# Patient Record
Sex: MALE | Race: WHITE | Employment: UNEMPLOYED
[De-identification: names, ages, dates, MRNs, and addresses within clinical notes are randomized per-mention and may not be internally consistent; named-entity substitution may affect disease eponyms.]

---

## 2017-02-06 ENCOUNTER — TELEPHONE (OUTPATIENT)
Dept: FAMILY MEDICINE CLINIC | Facility: CLINIC | Age: 47
End: 2017-02-06

## 2017-02-06 ENCOUNTER — OFFICE VISIT (OUTPATIENT)
Dept: FAMILY MEDICINE CLINIC | Facility: CLINIC | Age: 47
End: 2017-02-06

## 2017-02-06 VITALS
BODY MASS INDEX: 26.05 KG/M2 | OXYGEN SATURATION: 98 % | HEART RATE: 74 BPM | DIASTOLIC BLOOD PRESSURE: 70 MMHG | HEIGHT: 70 IN | SYSTOLIC BLOOD PRESSURE: 100 MMHG | TEMPERATURE: 97.6 F | WEIGHT: 182 LBS

## 2017-02-06 DIAGNOSIS — Z95.4 HISTORY OF AORTIC VALVE REPLACEMENT WITH METALLIC VALVE: ICD-10-CM

## 2017-02-06 DIAGNOSIS — I63.412 CEREBROVASCULAR ACCIDENT (CVA) DUE TO EMBOLISM OF LEFT MIDDLE CEREBRAL ARTERY (HCC): Primary | ICD-10-CM

## 2017-02-06 DIAGNOSIS — E78.5 DYSLIPIDEMIA: ICD-10-CM

## 2017-02-06 DIAGNOSIS — J32.9 SINUSITIS, UNSPECIFIED CHRONICITY, UNSPECIFIED LOCATION: ICD-10-CM

## 2017-02-06 PROCEDURE — 99213 OFFICE O/P EST LOW 20 MIN: CPT | Performed by: PHYSICIAN ASSISTANT

## 2017-02-06 RX ORDER — AMOXICILLIN 500 MG/1
1000 CAPSULE ORAL 2 TIMES DAILY
Qty: 4 CAPSULE | Refills: 0 | Status: SHIPPED | OUTPATIENT
Start: 2017-02-06 | End: 2017-02-16

## 2017-02-06 RX ORDER — AMOXICILLIN AND CLAVULANATE POTASSIUM 875; 125 MG/1; MG/1
1 TABLET, FILM COATED ORAL 2 TIMES DAILY
Qty: 20 TABLET | Refills: 0 | Status: SHIPPED | OUTPATIENT
Start: 2017-02-06 | End: 2017-02-16

## 2017-02-06 ASSESSMENT — ENCOUNTER SYMPTOMS
NAUSEA: 0
EYE DISCHARGE: 0
SORE THROAT: 0
HOARSE VOICE: 0
CHEST TIGHTNESS: 0
ABDOMINAL PAIN: 0
COUGH: 0
SHORTNESS OF BREATH: 0
DIARRHEA: 0
VOMITING: 0
EYE ITCHING: 0
RHINORRHEA: 0
SINUS PRESSURE: 1

## 2017-02-06 ASSESSMENT — PATIENT HEALTH QUESTIONNAIRE - PHQ9
2. FEELING DOWN, DEPRESSED OR HOPELESS: 0
SUM OF ALL RESPONSES TO PHQ9 QUESTIONS 1 & 2: 0
SUM OF ALL RESPONSES TO PHQ QUESTIONS 1-9: 0
1. LITTLE INTEREST OR PLEASURE IN DOING THINGS: 0

## 2017-02-08 ENCOUNTER — TELEPHONE (OUTPATIENT)
Dept: PHARMACY | Age: 47
End: 2017-02-08

## 2017-03-08 ENCOUNTER — APPOINTMENT (OUTPATIENT)
Dept: GENERAL RADIOLOGY | Age: 47
End: 2017-03-08
Payer: COMMERCIAL

## 2017-03-08 ENCOUNTER — HOSPITAL ENCOUNTER (EMERGENCY)
Age: 47
Discharge: HOME | End: 2017-03-08
Attending: EMERGENCY MEDICINE
Payer: COMMERCIAL

## 2017-03-08 ENCOUNTER — HOSPITAL ENCOUNTER (OUTPATIENT)
Dept: PHARMACY | Age: 47
Setting detail: THERAPIES SERIES
Discharge: HOME | End: 2017-03-08
Payer: COMMERCIAL

## 2017-03-08 VITALS
HEART RATE: 74 BPM | DIASTOLIC BLOOD PRESSURE: 75 MMHG | HEIGHT: 70 IN | TEMPERATURE: 97.8 F | BODY MASS INDEX: 25.77 KG/M2 | SYSTOLIC BLOOD PRESSURE: 136 MMHG | WEIGHT: 180 LBS | OXYGEN SATURATION: 97 % | RESPIRATION RATE: 16 BRPM

## 2017-03-08 DIAGNOSIS — M77.9 TENDONITIS: Primary | ICD-10-CM

## 2017-03-08 DIAGNOSIS — Z95.4 HISTORY OF AORTIC VALVE REPLACEMENT WITH METALLIC VALVE: ICD-10-CM

## 2017-03-08 LAB
INR BLD: 1.7
PROTIME: 20.1 SECONDS

## 2017-03-08 PROCEDURE — 73110 X-RAY EXAM OF WRIST: CPT

## 2017-03-08 PROCEDURE — 29125 APPL SHORT ARM SPLINT STATIC: CPT

## 2017-03-08 PROCEDURE — G0463 HOSPITAL OUTPT CLINIC VISIT: HCPCS

## 2017-03-08 PROCEDURE — 85610 PROTHROMBIN TIME: CPT

## 2017-03-08 PROCEDURE — G0381 LEV 2 HOSP TYPE B ED VISIT: HCPCS

## 2017-03-08 RX ORDER — TRAMADOL HYDROCHLORIDE 50 MG/1
50 TABLET ORAL EVERY 6 HOURS PRN
Qty: 10 TABLET | Refills: 0 | Status: SHIPPED | OUTPATIENT
Start: 2017-03-08

## 2017-03-08 ASSESSMENT — ENCOUNTER SYMPTOMS
COLOR CHANGE: 0
SHORTNESS OF BREATH: 0
VOMITING: 0
WHEEZING: 0
BACK PAIN: 0
ABDOMINAL DISTENTION: 0
ABDOMINAL PAIN: 0
NAUSEA: 0
DIARRHEA: 0

## 2017-03-08 ASSESSMENT — PAIN DESCRIPTION - PAIN TYPE: TYPE: ACUTE PAIN

## 2017-03-08 ASSESSMENT — PAIN DESCRIPTION - LOCATION: LOCATION: WRIST

## 2017-03-08 ASSESSMENT — PAIN DESCRIPTION - ORIENTATION: ORIENTATION: LEFT

## 2017-03-08 ASSESSMENT — PAIN DESCRIPTION - FREQUENCY: FREQUENCY: INTERMITTENT

## 2017-03-08 ASSESSMENT — PAIN SCALES - GENERAL: PAINLEVEL_OUTOF10: 7

## 2017-03-22 ENCOUNTER — TELEPHONE (OUTPATIENT)
Dept: PHARMACY | Age: 47
End: 2017-03-22

## 2017-04-10 ENCOUNTER — HOSPITAL ENCOUNTER (OUTPATIENT)
Dept: PHARMACY | Age: 47
Setting detail: THERAPIES SERIES
Discharge: HOME | End: 2017-04-10
Payer: COMMERCIAL

## 2017-04-10 DIAGNOSIS — Z95.4 HISTORY OF AORTIC VALVE REPLACEMENT WITH METALLIC VALVE: ICD-10-CM

## 2017-04-10 LAB
INR BLD: 3.4
PROTIME: 40.7 SECONDS

## 2017-04-10 PROCEDURE — 85610 PROTHROMBIN TIME: CPT

## 2017-04-10 PROCEDURE — G0463 HOSPITAL OUTPT CLINIC VISIT: HCPCS

## 2017-05-18 ENCOUNTER — TELEPHONE (OUTPATIENT)
Dept: PHARMACY | Age: 47
End: 2017-05-18

## 2017-07-11 ENCOUNTER — TELEPHONE (OUTPATIENT)
Dept: PHARMACY | Age: 47
End: 2017-07-11

## 2017-07-25 ENCOUNTER — TELEPHONE (OUTPATIENT)
Dept: PHARMACY | Age: 47
End: 2017-07-25

## 2017-08-24 ENCOUNTER — TELEPHONE (OUTPATIENT)
Dept: FAMILY MEDICINE CLINIC | Age: 47
End: 2017-08-24

## 2017-08-24 ENCOUNTER — TELEPHONE (OUTPATIENT)
Dept: PHARMACY | Age: 47
End: 2017-08-24

## 2025-06-25 ENCOUNTER — HOSPITAL ENCOUNTER (INPATIENT)
Age: 55
LOS: 2 days | Discharge: HOME | End: 2025-06-27
Attending: STUDENT IN AN ORGANIZED HEALTH CARE EDUCATION/TRAINING PROGRAM | Admitting: INTERNAL MEDICINE
Payer: COMMERCIAL

## 2025-06-25 ENCOUNTER — APPOINTMENT (OUTPATIENT)
Dept: GENERAL RADIOLOGY | Age: 55
End: 2025-06-25
Payer: COMMERCIAL

## 2025-06-25 ENCOUNTER — APPOINTMENT (OUTPATIENT)
Dept: CT IMAGING | Age: 55
End: 2025-06-25
Payer: COMMERCIAL

## 2025-06-25 DIAGNOSIS — I24.9 ACUTE CORONARY SYNDROME (HCC): ICD-10-CM

## 2025-06-25 DIAGNOSIS — F14.10 COCAINE ABUSE (HCC): Primary | ICD-10-CM

## 2025-06-25 DIAGNOSIS — R07.9 CHEST PAIN, UNSPECIFIED TYPE: ICD-10-CM

## 2025-06-25 PROBLEM — I21.4 NSTEMI (NON-ST ELEVATED MYOCARDIAL INFARCTION) (HCC): Status: ACTIVE | Noted: 2025-06-25

## 2025-06-25 LAB
ALBUMIN SERPL-MCNC: 4.2 G/DL (ref 3.5–5.2)
ALBUMIN/GLOB SERPL: 1.7 {RATIO} (ref 1–2.5)
ALP SERPL-CCNC: 94 U/L (ref 40–129)
ALT SERPL-CCNC: 16 U/L (ref 10–50)
AMPHET UR QL SCN: POSITIVE
ANION GAP SERPL CALCULATED.3IONS-SCNC: 16 MMOL/L (ref 9–16)
AST SERPL-CCNC: 28 U/L (ref 10–50)
BARBITURATES UR QL SCN: NEGATIVE
BASOPHILS # BLD: 0.07 K/UL (ref 0–0.2)
BASOPHILS NFR BLD: 1 % (ref 0–2)
BENZODIAZ UR QL: NEGATIVE
BILIRUB SERPL-MCNC: 0.5 MG/DL (ref 0–1.2)
BUN SERPL-MCNC: 11 MG/DL (ref 6–20)
CALCIUM SERPL-MCNC: 9.3 MG/DL (ref 8.6–10.4)
CANNABINOIDS UR QL SCN: POSITIVE
CHLORIDE SERPL-SCNC: 98 MMOL/L (ref 98–107)
CHOLEST SERPL-MCNC: 152 MG/DL (ref 0–199)
CHOLESTEROL/HDL RATIO: 3.2
CO2 SERPL-SCNC: 19 MMOL/L (ref 20–31)
COCAINE UR QL SCN: POSITIVE
CREAT SERPL-MCNC: 1.2 MG/DL (ref 0.7–1.2)
EOSINOPHIL # BLD: 0.13 K/UL (ref 0–0.44)
EOSINOPHILS RELATIVE PERCENT: 1 % (ref 1–4)
ERYTHROCYTE [DISTWIDTH] IN BLOOD BY AUTOMATED COUNT: 12.6 % (ref 11.8–14.4)
EST. AVERAGE GLUCOSE BLD GHB EST-MCNC: 97 MG/DL
ETHANOL PERCENT: <0.01 %
ETHANOLAMINE SERPL-MCNC: <10 MG/DL (ref 0–0.08)
FENTANYL UR QL: NEGATIVE
GFR, ESTIMATED: 73 ML/MIN/1.73M2
GLUCOSE SERPL-MCNC: 101 MG/DL (ref 74–99)
HBA1C MFR BLD: 5 % (ref 4–6)
HCT VFR BLD AUTO: 38.5 % (ref 40.7–50.3)
HDLC SERPL-MCNC: 47 MG/DL
HGB BLD-MCNC: 13.8 G/DL (ref 13–17)
IMM GRANULOCYTES # BLD AUTO: 0.04 K/UL (ref 0–0.3)
IMM GRANULOCYTES NFR BLD: 0 %
INR PPP: 5.3
LDLC SERPL CALC-MCNC: 92 MG/DL (ref 0–100)
LYMPHOCYTES NFR BLD: 1.25 K/UL (ref 1.1–3.7)
LYMPHOCYTES RELATIVE PERCENT: 13 % (ref 24–43)
MCH RBC QN AUTO: 32.1 PG (ref 25.2–33.5)
MCHC RBC AUTO-ENTMCNC: 35.8 G/DL (ref 28.4–34.8)
MCV RBC AUTO: 89.5 FL (ref 82.6–102.9)
METHADONE UR QL: NEGATIVE
MONOCYTES NFR BLD: 1.08 K/UL (ref 0.1–1.2)
MONOCYTES NFR BLD: 11 % (ref 3–12)
NEUTROPHILS NFR BLD: 74 % (ref 36–65)
NEUTS SEG NFR BLD: 7.11 K/UL (ref 1.5–8.1)
NRBC BLD-RTO: 0 PER 100 WBC
OPIATES UR QL SCN: NEGATIVE
OXYCODONE UR QL SCN: NEGATIVE
PCP UR QL SCN: NEGATIVE
PLATELET # BLD AUTO: 290 K/UL (ref 138–453)
PMV BLD AUTO: 9.9 FL (ref 8.1–13.5)
POTASSIUM SERPL-SCNC: 3.8 MMOL/L (ref 3.7–5.3)
PROT SERPL-MCNC: 6.7 G/DL (ref 6.6–8.7)
PROTHROMBIN TIME: 50.9 SEC (ref 11.7–14.9)
RBC # BLD AUTO: 4.3 M/UL (ref 4.21–5.77)
SODIUM SERPL-SCNC: 133 MMOL/L (ref 136–145)
TEST INFORMATION: ABNORMAL
TRIGL SERPL-MCNC: 67 MG/DL
TROPONIN I SERPL HS-MCNC: 10 NG/L (ref 0–22)
TROPONIN I SERPL HS-MCNC: 23 NG/L (ref 0–22)
TROPONIN I SERPL HS-MCNC: 48 NG/L (ref 0–22)
VLDLC SERPL CALC-MCNC: 13 MG/DL (ref 1–30)
WBC OTHER # BLD: 9.7 K/UL (ref 3.5–11.3)

## 2025-06-25 PROCEDURE — 84484 ASSAY OF TROPONIN QUANT: CPT

## 2025-06-25 PROCEDURE — 2500000003 HC RX 250 WO HCPCS

## 2025-06-25 PROCEDURE — 70450 CT HEAD/BRAIN W/O DYE: CPT

## 2025-06-25 PROCEDURE — 85025 COMPLETE CBC W/AUTO DIFF WBC: CPT

## 2025-06-25 PROCEDURE — 83036 HEMOGLOBIN GLYCOSYLATED A1C: CPT

## 2025-06-25 PROCEDURE — G0480 DRUG TEST DEF 1-7 CLASSES: HCPCS

## 2025-06-25 PROCEDURE — 1200000000 HC SEMI PRIVATE

## 2025-06-25 PROCEDURE — 99223 1ST HOSP IP/OBS HIGH 75: CPT | Performed by: INTERNAL MEDICINE

## 2025-06-25 PROCEDURE — 71045 X-RAY EXAM CHEST 1 VIEW: CPT

## 2025-06-25 PROCEDURE — 99285 EMERGENCY DEPT VISIT HI MDM: CPT

## 2025-06-25 PROCEDURE — 6360000002 HC RX W HCPCS: Performed by: STUDENT IN AN ORGANIZED HEALTH CARE EDUCATION/TRAINING PROGRAM

## 2025-06-25 PROCEDURE — 80061 LIPID PANEL: CPT

## 2025-06-25 PROCEDURE — 85610 PROTHROMBIN TIME: CPT

## 2025-06-25 PROCEDURE — 96375 TX/PRO/DX INJ NEW DRUG ADDON: CPT

## 2025-06-25 PROCEDURE — 96374 THER/PROPH/DIAG INJ IV PUSH: CPT

## 2025-06-25 PROCEDURE — 93005 ELECTROCARDIOGRAM TRACING: CPT

## 2025-06-25 PROCEDURE — 80307 DRUG TEST PRSMV CHEM ANLYZR: CPT

## 2025-06-25 PROCEDURE — 93005 ELECTROCARDIOGRAM TRACING: CPT | Performed by: STUDENT IN AN ORGANIZED HEALTH CARE EDUCATION/TRAINING PROGRAM

## 2025-06-25 PROCEDURE — 80053 COMPREHEN METABOLIC PANEL: CPT

## 2025-06-25 RX ORDER — HEPARIN SODIUM 1000 [USP'U]/ML
4000 INJECTION, SOLUTION INTRAVENOUS; SUBCUTANEOUS PRN
Status: DISCONTINUED | OUTPATIENT
Start: 2025-06-25 | End: 2025-06-25

## 2025-06-25 RX ORDER — ASPIRIN 81 MG/1
81 TABLET, CHEWABLE ORAL DAILY
Status: DISCONTINUED | OUTPATIENT
Start: 2025-06-26 | End: 2025-06-27 | Stop reason: HOSPADM

## 2025-06-25 RX ORDER — SODIUM CHLORIDE 0.9 % (FLUSH) 0.9 %
5-40 SYRINGE (ML) INJECTION PRN
Status: DISCONTINUED | OUTPATIENT
Start: 2025-06-25 | End: 2025-06-27 | Stop reason: HOSPADM

## 2025-06-25 RX ORDER — ONDANSETRON 4 MG/1
4 TABLET, ORALLY DISINTEGRATING ORAL EVERY 8 HOURS PRN
Status: DISCONTINUED | OUTPATIENT
Start: 2025-06-25 | End: 2025-06-27 | Stop reason: HOSPADM

## 2025-06-25 RX ORDER — FENTANYL CITRATE 50 UG/ML
50 INJECTION, SOLUTION INTRAMUSCULAR; INTRAVENOUS ONCE
Status: COMPLETED | OUTPATIENT
Start: 2025-06-25 | End: 2025-06-25

## 2025-06-25 RX ORDER — POTASSIUM CHLORIDE 7.45 MG/ML
10 INJECTION INTRAVENOUS PRN
Status: DISCONTINUED | OUTPATIENT
Start: 2025-06-25 | End: 2025-06-27 | Stop reason: HOSPADM

## 2025-06-25 RX ORDER — POTASSIUM CHLORIDE 1500 MG/1
40 TABLET, EXTENDED RELEASE ORAL PRN
Status: DISCONTINUED | OUTPATIENT
Start: 2025-06-25 | End: 2025-06-27 | Stop reason: HOSPADM

## 2025-06-25 RX ORDER — LORAZEPAM 0.5 MG/1
1 TABLET ORAL ONCE
Status: DISCONTINUED | OUTPATIENT
Start: 2025-06-25 | End: 2025-06-25

## 2025-06-25 RX ORDER — NITROGLYCERIN 0.4 MG/1
0.4 TABLET SUBLINGUAL PRN
Status: DISCONTINUED | OUTPATIENT
Start: 2025-06-25 | End: 2025-06-27 | Stop reason: HOSPADM

## 2025-06-25 RX ORDER — ATORVASTATIN CALCIUM 80 MG/1
40 TABLET, FILM COATED ORAL NIGHTLY
Status: DISCONTINUED | OUTPATIENT
Start: 2025-06-25 | End: 2025-06-27 | Stop reason: HOSPADM

## 2025-06-25 RX ORDER — HEPARIN SODIUM 10000 [USP'U]/100ML
5-30 INJECTION, SOLUTION INTRAVENOUS CONTINUOUS
Status: DISCONTINUED | OUTPATIENT
Start: 2025-06-25 | End: 2025-06-25

## 2025-06-25 RX ORDER — ACETAMINOPHEN 650 MG/1
650 SUPPOSITORY RECTAL EVERY 6 HOURS PRN
Status: DISCONTINUED | OUTPATIENT
Start: 2025-06-25 | End: 2025-06-27 | Stop reason: HOSPADM

## 2025-06-25 RX ORDER — LORAZEPAM 2 MG/ML
1 INJECTION INTRAMUSCULAR ONCE
Status: COMPLETED | OUTPATIENT
Start: 2025-06-25 | End: 2025-06-25

## 2025-06-25 RX ORDER — ONDANSETRON 2 MG/ML
4 INJECTION INTRAMUSCULAR; INTRAVENOUS EVERY 6 HOURS PRN
Status: DISCONTINUED | OUTPATIENT
Start: 2025-06-25 | End: 2025-06-27 | Stop reason: HOSPADM

## 2025-06-25 RX ORDER — SODIUM CHLORIDE 0.9 % (FLUSH) 0.9 %
5-40 SYRINGE (ML) INJECTION EVERY 12 HOURS SCHEDULED
Status: DISCONTINUED | OUTPATIENT
Start: 2025-06-25 | End: 2025-06-27 | Stop reason: HOSPADM

## 2025-06-25 RX ORDER — MAGNESIUM SULFATE IN WATER 40 MG/ML
2000 INJECTION, SOLUTION INTRAVENOUS PRN
Status: DISCONTINUED | OUTPATIENT
Start: 2025-06-25 | End: 2025-06-27 | Stop reason: HOSPADM

## 2025-06-25 RX ORDER — ACETAMINOPHEN 325 MG/1
650 TABLET ORAL EVERY 6 HOURS PRN
Status: DISCONTINUED | OUTPATIENT
Start: 2025-06-25 | End: 2025-06-27 | Stop reason: HOSPADM

## 2025-06-25 RX ORDER — POLYETHYLENE GLYCOL 3350 17 G/17G
17 POWDER, FOR SOLUTION ORAL DAILY PRN
Status: DISCONTINUED | OUTPATIENT
Start: 2025-06-25 | End: 2025-06-27 | Stop reason: HOSPADM

## 2025-06-25 RX ORDER — HEPARIN SODIUM 1000 [USP'U]/ML
4000 INJECTION, SOLUTION INTRAVENOUS; SUBCUTANEOUS ONCE
Status: DISCONTINUED | OUTPATIENT
Start: 2025-06-25 | End: 2025-06-25

## 2025-06-25 RX ORDER — SODIUM CHLORIDE 9 MG/ML
INJECTION, SOLUTION INTRAVENOUS PRN
Status: DISCONTINUED | OUTPATIENT
Start: 2025-06-25 | End: 2025-06-27 | Stop reason: HOSPADM

## 2025-06-25 RX ORDER — HEPARIN SODIUM 1000 [USP'U]/ML
2000 INJECTION, SOLUTION INTRAVENOUS; SUBCUTANEOUS PRN
Status: DISCONTINUED | OUTPATIENT
Start: 2025-06-25 | End: 2025-06-25

## 2025-06-25 RX ADMIN — Medication 1 MG: at 12:12

## 2025-06-25 RX ADMIN — SODIUM CHLORIDE, PRESERVATIVE FREE 10 ML: 5 INJECTION INTRAVENOUS at 21:45

## 2025-06-25 RX ADMIN — FENTANYL CITRATE 50 MCG: 50 INJECTION INTRAMUSCULAR; INTRAVENOUS at 11:35

## 2025-06-25 ASSESSMENT — PAIN DESCRIPTION - FREQUENCY: FREQUENCY: CONTINUOUS

## 2025-06-25 ASSESSMENT — PAIN SCALES - GENERAL: PAINLEVEL_OUTOF10: 10

## 2025-06-25 ASSESSMENT — ENCOUNTER SYMPTOMS
SHORTNESS OF BREATH: 0
COUGH: 0

## 2025-06-25 ASSESSMENT — PAIN - FUNCTIONAL ASSESSMENT: PAIN_FUNCTIONAL_ASSESSMENT: 0-10

## 2025-06-25 ASSESSMENT — PAIN DESCRIPTION - LOCATION: LOCATION: CHEST

## 2025-06-25 ASSESSMENT — PAIN DESCRIPTION - PAIN TYPE: TYPE: ACUTE PAIN
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
## 2025-06-25 NOTE — H&P
[facility]     Department of Internal Medicine - Staff Internal Medicine Teaching Service          ADMISSION NOTE/HISTORY AND PHYSICAL EXAMINATION   Date: 6/25/2025  Patient Name: Jj Murry  Date of admission: 6/25/2025 11:09 AM  YOB: 1970  PCP: Teresa Alejandro PA-C  History Obtained From:  patient, electronic medical record    CHIEF COMPLAINT     Chest pain    HISTORY OF PRESENTING ILLNESS     The patient is a 54 y.o. 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  According the patient he was all right until today when he started having chest pain sudden in onset while running for exercise however the chest pain was central, nonradiating and associated with shortness of breath and palpitation, he never lost consciousness no episodes of dizziness or lightheadedness.  He currently takes Coumadin however does not compliantly follow-up with Coumadin clinic, his last dose was yesterday.    Review of Systems   Constitutional:  Negative for chills and fever.   Respiratory:  Negative for cough and shortness of breath.    Psychiatric/Behavioral:  Positive for decreased concentration.         Initial Vitals on Presentation:  Temp: Afebrile, RR: 26,  sinus rhythm, /87, SPO2 98% on room air    Initial Course in the ED:   - Patient was escorted by EMS to ED when patient was found outside being altered in creatinine chest, patient was agitated and unable to sit still, patient was also hallucinating, patient was hemodynamically stable however was in sinus tachycardia as well  - Initial lab workup showed sodium 133, glucose 101, troponin, 34 metabolites and cannabinoids and INR was therapeutic about 5.3  - Imaging studies with chest x-ray showed no acute cardiopulmonary process however CT head was done as well which showed chronic

## 2025-06-25 NOTE — ED PROVIDER NOTES
[facility]  Emergency Department  Emergency Medicine Resident Turn-Over     Note Started: 2:42 PM EDT    Care of jJ Murry was assumed from Dr. Zuniga and is being seen for Chest Pain and Altered Mental Status  .  The patient's initial evaluation and plan have been discussed with the prior provider who initially evaluated the patient.     EMERGENCY DEPARTMENT COURSE / MEDICAL DECISION MAKING:       MEDICATIONS GIVEN:  Orders Placed This Encounter   Medications    fentaNYL (SUBLIMAZE) injection 50 mcg    DISCONTD: LORazepam (ATIVAN) tablet 1 mg    LORazepam (ATIVAN) injection 1 mg    DISCONTD: heparin (porcine) injection 4,000 Units    DISCONTD: heparin (porcine) injection 4,000 Units    DISCONTD: heparin (porcine) injection 2,000 Units    DISCONTD: heparin 25,000 units in dextrose 5% 250 mL (premix) infusion    sodium chloride flush 0.9 % injection 5-40 mL    sodium chloride flush 0.9 % injection 5-40 mL    0.9 % sodium chloride infusion    OR Linked Order Group     potassium chloride (KLOR-CON M) extended release tablet 40 mEq     potassium bicarb-citric acid (EFFER-K) effervescent tablet 40 mEq     potassium chloride 10 mEq/100 mL IVPB (Peripheral Line)    magnesium sulfate 2000 mg in 50 mL IVPB premix    OR Linked Order Group     ondansetron (ZOFRAN-ODT) disintegrating tablet 4 mg     ondansetron (ZOFRAN) injection 4 mg    polyethylene glycol (GLYCOLAX) packet 17 g    OR Linked Order Group     acetaminophen (TYLENOL) tablet 650 mg     acetaminophen (TYLENOL) suppository 650 mg    melatonin tablet 3 mg    nitroGLYCERIN (NITROSTAT) SL tablet 0.4 mg    atorvastatin (LIPITOR) tablet 40 mg    aspirin chewable tablet 81 mg    warfarin placeholder: dosing by pharmacy     What is the patient's goal INR?:   Other     Other INR goal:   2.0-2.5       LABS / RADIOLOGY:     Labs Reviewed   CBC WITH AUTO DIFFERENTIAL - Abnormal; Notable for the following components:       Result Value    Hematocrit 38.5 (*)      MCHC 35.8 (*)     Neutrophils % 74 (*)     Lymphocytes % 13 (*)     All other components within normal limits   COMPREHENSIVE METABOLIC PANEL - Abnormal; Notable for the following components:    Sodium 133 (*)     CO2 19 (*)     Glucose 101 (*)     All other components within normal limits   TROPONIN - Abnormal; Notable for the following components:    Troponin, High Sensitivity 23 (*)     All other components within normal limits   URINE DRUG SCREEN - Abnormal; Notable for the following components:    Amphetamine Screen, Ur POSITIVE (*)     Cocaine Metabolite, Urine POSITIVE (*)     Cannabinoid Scrn, Ur POSITIVE (*)     All other components within normal limits   PROTIME-INR - Abnormal; Notable for the following components:    Protime 50.9 (*)     INR 5.3 (*)     All other components within normal limits   TROPONIN - Abnormal; Notable for the following components:    Troponin, High Sensitivity 48 (*)     All other components within normal limits   TROPONIN - Abnormal; Notable for the following components:    Troponin, High Sensitivity 24 (*)     All other components within normal limits   CULTURE, BLOOD 1   CULTURE, BLOOD 2   TROPONIN   ETHANOL   LIPID PANEL   HEMOGLOBIN A1C   BASIC METABOLIC PANEL W/ REFLEX TO MG FOR LOW K   CBC WITH AUTO DIFFERENTIAL   TROPONIN   TROPONIN   PROTIME-INR       CT HEAD WO CONTRAST  Result Date: 6/25/2025  EXAMINATION: CT OF THE HEAD WITHOUT CONTRAST  6/25/2025 1:28 pm TECHNIQUE: CT of the head was performed without the administration of intravenous contrast. Automated exposure control, iterative reconstruction, and/or weight based adjustment of the mA/kV was utilized to reduce the radiation dose to as low as reasonably achievable. COMPARISON: None. HISTORY: ORDERING SYSTEM PROVIDED HISTORY: AMS TECHNOLOGIST PROVIDED HISTORY: AMS Decision Support Exception - unselect if not a suspected or confirmed emergency medical condition->Emergency Medical Condition (MA) Reason for Exam:

## 2025-06-25 NOTE — DISCHARGE INSTRUCTIONS
You were admitted and treated for chest pain.  Please abstain from cocaine  You were evaluated by cardiology and your coronary CT showed 50% blockage of left circumflex vessel therefore please follow-up with cardiology outpatient.  Please get transthoracic echo done and follow-up with results at outpatient cardiology clinic.  Your INR was elevated so we could not put you on anticoagulation in the hospital.  Current INR is within the range so please continue taking Coumadin as prescribed and follow-up with Coumadin clinic.  You are started on nitro state, please take it as needed in case of chest pain.  Please quit smoking and please refrain from  IV drugs such as cocaine, amphetamine and marijuana as it can lead to worsening chest pain and heart attack.  Eat a heart-healthy diet that is low in saturated fat and salt, and is full of fruits, vegetables and whole-grains. You may get more details about how to eat healthy. But these tips can help you get started.

## 2025-06-25 NOTE — H&P
Attending Supervising Physician’s Attestation Statement  I have seen and examined Jj Murry and the peace elements of all parts of the encounter have been performed by me.  I agree with the assessment, plan and orders as documented. I discussed the findings and plans with the resident physician and agree as documented in their note .    In addition to the pertinent positives and negatives as stated within HPI and the review of systems as documented in the notes, all other systems were reviewed when able to and are reported negative.    Additional Comments:   Presented with chest pain  Hx CVA, baseline aphasia  Hx from family at bedside  C/o chest pain for several weeks  Hx cad/cabg, no recent follow up with cardiology  Troponin elevated  Cardiology consulted   Continue to trend troponin     Electronically signed by Gabino Sanchez MD on 6/25/2025 at 5:45 PM

## 2025-06-25 NOTE — ED PROVIDER NOTES
[place] [facility] EMERGENCY DEPARTMENT  EMERGENCY DEPARTMENT ENCOUNTER    Pt Name: Jj Murry  MRN: [id number]  [id number]  Date of evaluation: 2025    Note Started: 11:29 AM EDT    CHIEF COMPLAINT       Chief Complaint   Patient presents with    Chest Pain    Altered Mental Status     HISTORY OF PRESENT ILLNESS    Jj Murry is a 54 y.o. male who presents with chest pain which started while apparently out for a run. EMS was called and patient was extremely agitated. Patient is difficult historian grabbing at chest. He states he has difficulty with speech since prior stroke. Denies headache. Denies trauma.     Primary has care performed at [facility].   Hx of ischemic stroke with resultant expressive aphasia, prosthetic heart valve on coumadine,     REVIEW OF SYSTEMS       Review of Systems   Cardiovascular:  Positive for chest pain.   Psychiatric/Behavioral:  Positive for agitation and confusion.        PAST MEDICAL HISTORY    has a past medical history of Stroke of unknown cause (HCC).    SURGICAL HISTORY      has a past surgical history that includes Cardiac surgery (2008) and Mechanical aortic valve replacement.    CURRENT MEDICATIONS       Previous Medications    ASPIRIN 81 MG CHEWABLE TABLET    Take 1 tablet by mouth daily    ATORVASTATIN (LIPITOR) 40 MG TABLET    Take 1 tablet by mouth nightly    TRAMADOL (ULTRAM) 50 MG TABLET    Take 1 tablet by mouth every 6 hours as needed for Pain    WARFARIN (COUMADIN) 7.5 MG TABLET    Take one or one and one-half (1 or 1 1/2) tablets by mouth once daily as directed by Coumadin Clinic. #150 = 90 day supply)       ALLERGIES     has no known allergies.    FAMILY HISTORY     He indicated that his mother is alive. He indicated that his father is alive. He indicated that his maternal grandmother is alive. He indicated that his maternal grandfather is . He indicated that his paternal grandmother is . He indicated that his   [CP]   1548 CT HEAD WO CONTRAST  IMPRESSION:  1.  No acute intracranial findings.  2.  Chronic appearing encephalomalacia of the left temporoparietal lobes is compatible with remote territorial infarct. [EF]      ED Course User Index  [CP] Bj Carlson MD  [EF] Rajiv Restrepo DO         CRITICAL CARE:     There was significant risk of life threatening deterioration of patient's condition requiring my direct management. Critical care time 25 minutes, excluding any documented procedures.      CONSULTS:  IP CONSULT TO CARDIOLOGY  IP CONSULT TO INTERNAL MEDICINE  IP CONSULT TO CASE MANAGEMENT    PROCEDURES:  None    FINAL IMPRESSION      1. Cocaine abuse (HCC)    2. Chest pain, unspecified type          DISPOSITION/PLAN     DISPOSITION Admitted 06/25/2025 05:16:08 PM   DISPOSITION CONDITION Stable       PATIENT REFERRED TO:  No follow-up provider specified.    DISCHARGE MEDICATIONS:  New Prescriptions    No medications on file       (Please note that portions of this note were completed with a voice recognition program.  Efforts were made to edit the dictations butoccasionally words are mis-transcribed.)    Bj Carlson MD  Attending Emergency Physician                   Bj Carlson MD  06/25/25 [id number]
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
## 2025-06-26 ENCOUNTER — APPOINTMENT (OUTPATIENT)
Age: 55
End: 2025-06-26
Payer: COMMERCIAL

## 2025-06-26 PROBLEM — F14.10 COCAINE ABUSE (HCC): Status: ACTIVE | Noted: 2025-06-26

## 2025-06-26 LAB
ANION GAP SERPL CALCULATED.3IONS-SCNC: 12 MMOL/L (ref 9–16)
BASOPHILS # BLD: 0.11 K/UL (ref 0–0.2)
BASOPHILS NFR BLD: 1 % (ref 0–2)
BUN SERPL-MCNC: 13 MG/DL (ref 6–20)
CALCIUM SERPL-MCNC: 8.6 MG/DL (ref 8.6–10.4)
CHLORIDE SERPL-SCNC: 108 MMOL/L (ref 98–107)
CO2 SERPL-SCNC: 19 MMOL/L (ref 20–31)
CREAT SERPL-MCNC: 0.9 MG/DL (ref 0.7–1.2)
EOSINOPHIL # BLD: 0.62 K/UL (ref 0–0.44)
EOSINOPHILS RELATIVE PERCENT: 7 % (ref 1–4)
ERYTHROCYTE [DISTWIDTH] IN BLOOD BY AUTOMATED COUNT: 13.3 % (ref 11.8–14.4)
GFR, ESTIMATED: >90 ML/MIN/1.73M2
GLUCOSE SERPL-MCNC: 100 MG/DL (ref 74–99)
HCT VFR BLD AUTO: 44.8 % (ref 40.7–50.3)
HGB BLD-MCNC: 14.4 G/DL (ref 13–17)
IMM GRANULOCYTES # BLD AUTO: 0.03 K/UL (ref 0–0.3)
IMM GRANULOCYTES NFR BLD: 0 %
INR PPP: 3.6
LYMPHOCYTES NFR BLD: 1.52 K/UL (ref 1.1–3.7)
LYMPHOCYTES RELATIVE PERCENT: 17 % (ref 24–43)
MCH RBC QN AUTO: 32.1 PG (ref 25.2–33.5)
MCHC RBC AUTO-ENTMCNC: 32.1 G/DL (ref 28.4–34.8)
MCV RBC AUTO: 99.8 FL (ref 82.6–102.9)
MONOCYTES NFR BLD: 0.93 K/UL (ref 0.1–1.2)
MONOCYTES NFR BLD: 10 % (ref 3–12)
NEUTROPHILS NFR BLD: 65 % (ref 36–65)
NEUTS SEG NFR BLD: 5.99 K/UL (ref 1.5–8.1)
NRBC BLD-RTO: 0 PER 100 WBC
PLATELET # BLD AUTO: 252 K/UL (ref 138–453)
PMV BLD AUTO: 9.6 FL (ref 8.1–13.5)
POTASSIUM SERPL-SCNC: 4.1 MMOL/L (ref 3.7–5.3)
PROTHROMBIN TIME: 37.2 SEC (ref 11.7–14.9)
RBC # BLD AUTO: 4.49 M/UL (ref 4.21–5.77)
SODIUM SERPL-SCNC: 139 MMOL/L (ref 136–145)
TROPONIN I SERPL HS-MCNC: 10 NG/L (ref 0–22)
TROPONIN I SERPL HS-MCNC: 12 NG/L (ref 0–22)
TROPONIN I SERPL HS-MCNC: 24 NG/L (ref 0–22)
TROPONIN I SERPL HS-MCNC: 9 NG/L (ref 0–22)
WBC OTHER # BLD: 9.2 K/UL (ref 3.5–11.3)

## 2025-06-26 PROCEDURE — 84484 ASSAY OF TROPONIN QUANT: CPT

## 2025-06-26 PROCEDURE — 6360000004 HC RX CONTRAST MEDICATION

## 2025-06-26 PROCEDURE — 2500000003 HC RX 250 WO HCPCS

## 2025-06-26 PROCEDURE — 97165 OT EVAL LOW COMPLEX 30 MIN: CPT

## 2025-06-26 PROCEDURE — 85610 PROTHROMBIN TIME: CPT

## 2025-06-26 PROCEDURE — 6370000000 HC RX 637 (ALT 250 FOR IP)

## 2025-06-26 PROCEDURE — 1200000000 HC SEMI PRIVATE

## 2025-06-26 PROCEDURE — 99232 SBSQ HOSP IP/OBS MODERATE 35: CPT | Performed by: INTERNAL MEDICINE

## 2025-06-26 PROCEDURE — 97161 PT EVAL LOW COMPLEX 20 MIN: CPT

## 2025-06-26 PROCEDURE — 99223 1ST HOSP IP/OBS HIGH 75: CPT | Performed by: INTERNAL MEDICINE

## 2025-06-26 PROCEDURE — 36415 COLL VENOUS BLD VENIPUNCTURE: CPT

## 2025-06-26 PROCEDURE — 94760 N-INVAS EAR/PLS OXIMETRY 1: CPT

## 2025-06-26 PROCEDURE — 97530 THERAPEUTIC ACTIVITIES: CPT

## 2025-06-26 PROCEDURE — 75574 CT ANGIO HRT W/3D IMAGE: CPT

## 2025-06-26 PROCEDURE — 85025 COMPLETE CBC W/AUTO DIFF WBC: CPT

## 2025-06-26 PROCEDURE — 80048 BASIC METABOLIC PNL TOTAL CA: CPT

## 2025-06-26 RX ORDER — IOPAMIDOL 755 MG/ML
109 INJECTION, SOLUTION INTRAVASCULAR
Status: COMPLETED | OUTPATIENT
Start: 2025-06-26 | End: 2025-06-26

## 2025-06-26 RX ORDER — METOPROLOL TARTRATE 1 MG/ML
5 INJECTION, SOLUTION INTRAVENOUS EVERY 5 MIN PRN
Status: DISCONTINUED | OUTPATIENT
Start: 2025-06-26 | End: 2025-06-27 | Stop reason: HOSPADM

## 2025-06-26 RX ORDER — METOPROLOL SUCCINATE 50 MG/1
50 TABLET, EXTENDED RELEASE ORAL ONCE
Status: COMPLETED | OUTPATIENT
Start: 2025-06-26 | End: 2025-06-26

## 2025-06-26 RX ADMIN — SODIUM CHLORIDE, PRESERVATIVE FREE 10 ML: 5 INJECTION INTRAVENOUS at 08:50

## 2025-06-26 RX ADMIN — METOPROLOL SUCCINATE 50 MG: 50 TABLET, EXTENDED RELEASE ORAL at 10:56

## 2025-06-26 RX ADMIN — IOPAMIDOL 109 ML: 755 INJECTION, SOLUTION INTRAVENOUS at 13:52

## 2025-06-26 RX ADMIN — ASPIRIN 81 MG: 81 TABLET, CHEWABLE ORAL at 08:50

## 2025-06-26 RX ADMIN — SODIUM CHLORIDE, PRESERVATIVE FREE 10 ML: 5 INJECTION INTRAVENOUS at 20:38

## 2025-06-26 RX ADMIN — ATORVASTATIN CALCIUM 40 MG: 80 TABLET, FILM COATED ORAL at 20:35

## 2025-06-26 ASSESSMENT — ENCOUNTER SYMPTOMS
SHORTNESS OF BREATH: 0
COUGH: 0

## 2025-06-26 ASSESSMENT — PAIN SCALES - GENERAL: PAINLEVEL_OUTOF10: 0

## 2025-06-26 NOTE — PROGRESS NOTES
[facility]  Internal Medicine Teaching Residency Program  Inpatient Daily Progress Note  ______________________________________________________________________________    Patient: Jj Murry  YOB: 1970   MRN:[id number]    Acct: [id number]     Room: 0546/0546-01  Admit date: 6/25/2025  Today's date: 06/26/25  Number of days in the hospital: 1  CODE STATUS: Full Code     SUBJECTIVE   Admitting Diagnosis: NSTEMI (non-ST elevated myocardial infarction) (HCC)  CC: Chest pain   Pt examined at bedside. Chart & results reviewed.     -Overnight events: No acute events overnight  -Current hemodynamic status: Hemodynamically stable  -Urine output: 700 mL    Morning labs: Pending    BMP: .unremarkable  CBC: .unremarkable  Blood glucose: .100  Imaging studies: .  Diet: Diet NPO     Review of Systems   Constitutional:  Negative for chills and fever.   Respiratory:  Negative for cough and shortness of breath.    Genitourinary:  Negative for dysuria.   Neurological:  Negative for headaches.        Specialty recommendations:    Cardiology: For concern of NSTEMI type II    Brief internal medicine plan:  Follow up on cCTA and TTE .  Continue to monitor  BRIEF HISTORY     The patient is a 54 y.o. 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  According the patient he was all right until today when he started having chest pain sudden in onset while running for exercise however the chest pain was central, nonradiating and associated with shortness of breath and palpitation, he never lost consciousness no episodes of dizziness or lightheadedness.  He currently takes Coumadin however does not compliantly follow-up with Coumadin clinic, his last dose was yesterday.     Review of Systems   Constitutional:  Negative for chills and fever.

## 2025-06-26 NOTE — PROGRESS NOTES
Spiritual Health History and Assessment/Progress Note  [facility]    Initial Encounter,  ,  ,      Name: Jj Murry MRN: [id number]    Age: 54 y.o.     Sex: male   Language: English   Druze: Other   NSTEMI (non-ST elevated myocardial infarction) (HCC)     Date: 6/26/2025            Total Time Calculated: 5 min              Spiritual Assessment began in [facility]        Referral/Consult From: Rounding   Encounter Overview/Reason: Initial Encounter  Service Provided For: Patient and family together    Pt in bed, 2 family members nearby. Writer introduced self as  and offered space to express thoughts, feelings, ideas. Pt expressed holing up alright and thanked writer for coming. Writer offered further  support as needed.    Beth, Belief, Meaning:   Patient unable to assess at this time  Family/Friends Other: unknown      Importance and Influence:  Patient unable to assess at this time  Family/Friends No family/friends present    Community:  Patient feels well-supported. Support system includes: Other: family members  Family/Friends feel well-supported. Support system includes: Other: family members    Assessment and Plan of Care:     Patient Interventions include: Facilitated expression of thoughts and feelings and Affirmed coping skills/support systems  Family/Friends Interventions include: Facilitated expression of thoughts and feelings and Affirmed coping skills/support systems    Patient Plan of Care: Spiritual Care available upon further referral  Family/Friends Plan of Care: Spiritual Care available upon further referral    Electronically signed by Rajiv Wright,  Intern on 6/26/2025 at 5:27 PM

## 2025-06-26 NOTE — PROGRESS NOTES
Occupational Therapy Initial Evaluation  Facility/Department: [address] [facility]   Patient Name: Jj Murry        MRN: [id number]    : 1970    Date of Service: 2025    Chief Complaint   Patient presents with    Chest Pain    Altered Mental Status     Past Medical History:  has a past medical history of Stroke of unknown cause (HCC).  Past Surgical History:  has a past surgical history that includes Cardiac surgery (2008) and Mechanical aortic valve replacement.    Discharge Recommendations   No therapy recommended at discharge.     OT Equipment Recommendations  Equipment Needed: No    Assessment  Assessment: Pt demonstrated IND in ADLs and functional mobilitytransfers. No acute OT goals identified at this time. Please re-order if future needs arise.  Prognosis: Good  Decision Making: Low Complexity  REQUIRES OT FOLLOW-UP: No  Activity Tolerance  Activity Tolerance: Patient Tolerated treatment well;Treatment limited secondary to agitation  Activity Tolerance Comments: Pt easily agitated when touched/asked numerous questions, and exhibited some impulsiveness.  Safety Devices  Type of Devices: Bed alarm in place;Call light within reach;Nurse notified;Left in bed  Restraints  Restraints Initially in Place: No    AM-PAC  AM-PAC Daily Activity - Inpatient   How much help is needed for putting on and taking off regular lower body clothing?: None  How much help is needed for bathing (which includes washing, rinsing, drying)?: None  How much help is needed for toileting (which includes using toilet, bedpan, or urinal)?: None  How much help is needed for putting on and taking off regular upper body clothing?: None  How much help is needed for taking care of personal grooming?: None  How much help for eating meals?: None  AM-PAC Inpatient Daily Activity Raw Score: 24  AM-PAC Inpatient ADL T-Scale Score : 57.54  ADL Inpatient CMS 0-100% Score: 0  ADL Inpatient CMS G-Code Modifier :

## 2025-06-26 NOTE — PROGRESS NOTES
Physical Therapy  Facility/Department: [address] [facility]   Physical Therapy Initial Evaluation    Patient Name: Jj Murry        MRN: [id number]    : 1970    Date of Service: 2025    Chief Complaint   Patient presents with    Chest Pain    Altered Mental Status     Past Medical History:  has a past medical history of Stroke of unknown cause (HCC).  Past Surgical History:  has a past surgical history that includes Cardiac surgery (2008) and Mechanical aortic valve replacement.    Discharge Recommendations   No further therapy required at discharge.   PT Equipment Recommendations  Equipment Needed: No    Assessment     Assessment: Pt completed sit to stand transfer and bed mobility with supervision. Pt amb 100' no device and supervision. Pt ascended and descended 10 steps with supervision. Pt is without acute PT needs Pt to d/c PT.  Therapy Prognosis: Good  Decision Making: Low Complexity  Requires PT Follow-Up: No  Activity Tolerance  Activity Tolerance: Patient tolerated treatment well  Safety Devices  Type of Devices: Bed alarm in place, Call light within reach, Nurse notified, Left in bed  Restraints  Restraints Initially in Place: No    AM-PAC  AM-PAC Basic Mobility - Inpatient   How much help is needed turning from your back to your side while in a flat bed without using bedrails?: None  How much help is needed moving from lying on your back to sitting on the side of a flat bed without using bedrails?: None  How much help is needed moving to and from a bed to a chair?: None  How much help is needed standing up from a chair using your arms?: None  How much help is needed walking in hospital room?: None  How much help is needed climbing 3-5 steps with a railing?: None  AM-PAC Inpatient Mobility Raw Score : 24  AM-PAC Inpatient T-Scale Score : 61.14  Mobility Inpatient CMS 0-100% Score: 0  Mobility Inpatient CMS G-Code Modifier :  (degrees)  LUE General AROM: see OT note          Strength RLE  Strength RLE: WFL  Comment: observed during functional mobility  Strength LLE  Strength LLE: WFL  Comment: observed during functional mobility  Strength RUE  Strength RUE: WFL  Comment: observed antigravity not formally tested  Strength LUE  Strength LUE: WFL  Comment: observed antigravity not formally tested    Mobility   Bed mobility  Supine to Sit: Supervision  Sit to Supine: Supervision  Scooting: Supervision  Bed Mobility Comments: pt impulsively sat up and ignored cues for line management         Transfers  Sit to Stand: Supervision  Stand to Sit: Supervision  Comment: Pt refused manual muscle testing because \"he's fine.\" Impulsively stood for ambulation.    Ambulation  Surface: Level tile  Device: No Device  Assistance: Supervision  Quality of Gait: good candence, reciprocal step pattern.  Distance: 100'    Stairs/Curb  Stairs?: Yes  Stairs  # Steps : 10 (ascending and descending)  Rails: Right ascending  Device: No Device  Assistance: Supervision  Comment: increased speed          Balance   Balance  Posture: Good  Sitting - Static: Good  Sitting - Dynamic: Good  Standing - Static: Good  Standing - Dynamic: Good  Comments: no device used    Patient Education  Patient Education  Education Given To: Patient  Education Provided: Role of Therapy  Education Method: Demonstration;Verbal  Barriers to Learning: None  Education Outcome: Verbalized understanding;Demonstrated understanding    Plan  Physical Therapy Plan  Additional Comments: d/c PT    Goals  Patient Goals   Patient Goals : D/C PT    Minutes  PT Individual Minutes  Time In: 0944  Time Out: 0957  Minutes: 13  Time Code Minutes  Timed Code Treatment Minutes: 8 Minutes    Electronically signed by CELIA Menendez on 6/26/25 at 10:20 AM EDT    This treatment/evaluation completed by signing SPT. Signing PT agrees with treatment and documentation.

## 2025-06-26 NOTE — CONSULTS
[facility]    Consult / H&P               Today's Date: 6/25/2025  Patient Name: Jj Murry  Date of admission: 6/25/2025 11:09 AM  Patient's age: 54 y.o., 1970  Admission Dx: Cocaine abuse (HCC) [F14.10]  NSTEMI (non-ST elevated myocardial infarction) ([facility]) [I21.4]  Chest pain, unspecified type [R07.9]    Requesting Physician: Gabino Sanchez MD    Cardiac Evaluation Reason:  Cocaine use, elevated troponins    History Obtained From: patient and chart review     History of Present Illness:    This patient 54 y.o. years old with past medical history  of metallic aortic valve on Coumadin, Hyperlipidemia, and CVA with expressive apahsia presented with concern of chest pain.  According the patient he was all right until today when he started having chest pain sudden in onset while running for exercise however the chest pain was central, nonradiating and associated with shortness of breath and palpitation, he never lost consciousness no episodes of dizziness or lightheadedness.  He currently takes Coumadin however does not compliantly follow-up with Coumadin clinic, his last dose was yesterday.    Past Medical History:   has a past medical history of Stroke of unknown cause ([facility]).    Past Surgical History:   has a past surgical history that includes Cardiac surgery (01/01/2008) and Mechanical aortic valve replacement.     Home Medications:    Prior to Admission medications    Medication Sig Start Date End Date Taking? Authorizing Provider   warfarin (COUMADIN) 7.5 MG tablet Take one or one and one-half (1 or 1 1/2) tablets by mouth once daily as directed by Coumadin Clinic. #150 = 90 day supply) 1/11/17  Yes Teresa Alejandro PA-C   atorvastatin (LIPITOR) 40 MG tablet Take 1 tablet by mouth nightly 11/1/16  Yes Teresa Alejandro PA-C   aspirin 81 MG chewable tablet Take 1 tablet by mouth daily 3/6/16  Yes Richa Matthews MD   traMADol (ULTRAM) 50 MG tablet Take 1 tablet by mouth every

## 2025-06-26 NOTE — PROGRESS NOTES
Pharmacy Note  Warfarin Consult    Jj Murry is a 54 y.o. male for whom pharmacy has been consulted to manage warfarin therapy.     Consulting Physician: Dr. Rosales  Reason for Admission: NSTEMI    Warfarin dose prior to admission: 11.25mg on Tuesday and Saturday, 7.5mg all other days.  Warfarin indication: Prosthetic heart valve  Target INR range: 2.0-2.5     Past Medical History:   Diagnosis Date    Stroke of unknown cause (HCC)                 Recent Labs     06/25/25  1130   INR 5.3*     Recent Labs     06/25/25  1130   HGB 13.8   HCT 38.5*          Current warfarin drug-drug interactions:       Date             INR        Dose   6/26/2025        5.3    Hold    Daily PT/INR while inpatient. PT/INR ordered to start 6/26 with morning labs.    Thank you for the consult.  Will continue to follow.

## 2025-06-27 ENCOUNTER — APPOINTMENT (OUTPATIENT)
Age: 55
End: 2025-06-27
Payer: COMMERCIAL

## 2025-06-27 VITALS
SYSTOLIC BLOOD PRESSURE: 114 MMHG | HEIGHT: 70 IN | WEIGHT: 180 LBS | BODY MASS INDEX: 25.77 KG/M2 | HEART RATE: 60 BPM | TEMPERATURE: 98.6 F | OXYGEN SATURATION: 98 % | DIASTOLIC BLOOD PRESSURE: 75 MMHG | RESPIRATION RATE: 20 BRPM

## 2025-06-27 LAB
ANION GAP SERPL CALCULATED.3IONS-SCNC: 12 MMOL/L (ref 9–16)
BASOPHILS # BLD: 0.14 K/UL (ref 0–0.2)
BASOPHILS NFR BLD: 1 % (ref 0–2)
BUN SERPL-MCNC: 16 MG/DL (ref 6–20)
CALCIUM SERPL-MCNC: 8.7 MG/DL (ref 8.6–10.4)
CHLORIDE SERPL-SCNC: 106 MMOL/L (ref 98–107)
CO2 SERPL-SCNC: 18 MMOL/L (ref 20–31)
CREAT SERPL-MCNC: 0.9 MG/DL (ref 0.7–1.2)
ECHO AO ROOT DIAM: 3.5 CM
ECHO AO ROOT INDEX: 1.75 CM/M2
ECHO AV AREA PEAK VELOCITY: 3.7 CM2
ECHO AV AREA VTI: 4 CM2
ECHO AV AREA/BSA PEAK VELOCITY: 1.9 CM2/M2
ECHO AV AREA/BSA VTI: 2 CM2/M2
ECHO AV MEAN GRADIENT: 9 MMHG
ECHO AV MEAN VELOCITY: 1.4 M/S
ECHO AV PEAK GRADIENT: 19 MMHG
ECHO AV PEAK VELOCITY: 2.2 M/S
ECHO AV VELOCITY RATIO: 0.82
ECHO AV VTI: 43.8 CM
ECHO BSA: 2.01 M2
ECHO EST RA PRESSURE: 3 MMHG
ECHO IVC PROX: 2 CM
ECHO LA AREA 2C: 16.6 CM2
ECHO LA AREA 4C: 16.2 CM2
ECHO LA DIAMETER INDEX: 1.95 CM/M2
ECHO LA DIAMETER: 3.9 CM
ECHO LA MAJOR AXIS: 4.7 CM
ECHO LA MINOR AXIS: 4.6 CM
ECHO LA TO AORTIC ROOT RATIO: 1.11
ECHO LA VOL BP: 47 ML (ref 18–58)
ECHO LA VOL MOD A2C: 47 ML (ref 18–58)
ECHO LA VOL MOD A4C: 45 ML (ref 18–58)
ECHO LA VOL/BSA BIPLANE: 24 ML/M2 (ref 16–34)
ECHO LA VOLUME INDEX MOD A2C: 24 ML/M2 (ref 16–34)
ECHO LA VOLUME INDEX MOD A4C: 23 ML/M2 (ref 16–34)
ECHO LV E' LATERAL VELOCITY: 9.57 CM/S
ECHO LV E' SEPTAL VELOCITY: 9.9 CM/S
ECHO LV EDV A2C: 96 ML
ECHO LV EDV A4C: 103 ML
ECHO LV EDV INDEX A4C: 52 ML/M2
ECHO LV EDV NDEX A2C: 48 ML/M2
ECHO LV EF PHYSICIAN: 61 %
ECHO LV EJECTION FRACTION A2C: 57 %
ECHO LV EJECTION FRACTION A4C: 63 %
ECHO LV EJECTION FRACTION BIPLANE: 61 % (ref 55–100)
ECHO LV ESV A2C: 42 ML
ECHO LV ESV A4C: 38 ML
ECHO LV ESV INDEX A2C: 21 ML/M2
ECHO LV ESV INDEX A4C: 19 ML/M2
ECHO LV INTERNAL DIMENSION DIASTOLE INDEX: 3 CM/M2
ECHO LV INTERNAL DIMENSION DIASTOLIC: 6 CM (ref 4.2–5.9)
ECHO LV IVSD: 0.8 CM (ref 0.6–1)
ECHO LV MASS 2D: 215.7 G (ref 88–224)
ECHO LV MASS INDEX 2D: 107.9 G/M2 (ref 49–115)
ECHO LV POSTERIOR WALL DIASTOLIC: 1 CM (ref 0.6–1)
ECHO LV RELATIVE WALL THICKNESS RATIO: 0.33
ECHO LVOT AREA: 4.5 CM2
ECHO LVOT AV VTI INDEX: 0.89
ECHO LVOT DIAM: 2.4 CM
ECHO LVOT MEAN GRADIENT: 6 MMHG
ECHO LVOT PEAK GRADIENT: 13 MMHG
ECHO LVOT PEAK VELOCITY: 1.8 M/S
ECHO LVOT STROKE VOLUME INDEX: 87.9 ML/M2
ECHO LVOT SV: 175.9 ML
ECHO LVOT VTI: 38.9 CM
ECHO MV A VELOCITY: 0.74 M/S
ECHO MV AREA VTI: 5.7 CM2
ECHO MV E DECELERATION TIME (DT): 209 MS
ECHO MV E VELOCITY: 0.75 M/S
ECHO MV E/A RATIO: 1.01
ECHO MV E/E' LATERAL: 7.84
ECHO MV E/E' RATIO (AVERAGED): 7.71
ECHO MV E/E' SEPTAL: 7.58
ECHO MV LVOT VTI INDEX: 0.79
ECHO MV MAX VELOCITY: 0.8 M/S
ECHO MV MEAN GRADIENT: 1 MMHG
ECHO MV MEAN VELOCITY: 0.4 M/S
ECHO MV PEAK GRADIENT: 2 MMHG
ECHO MV VTI: 30.6 CM
ECHO PV MAX VELOCITY: 1 M/S
ECHO PV PEAK GRADIENT: 4 MMHG
ECHO RIGHT VENTRICULAR SYSTOLIC PRESSURE (RVSP): 27 MMHG
ECHO RV BASAL DIMENSION: 3.8 CM
ECHO RV INTERNAL DIMENSION: 3 CM
ECHO RV MID DIMENSION: 3.6 CM
ECHO RV TAPSE: 2 CM (ref 1.7–?)
ECHO TV REGURGITANT MAX VELOCITY: 2.43 M/S
ECHO TV REGURGITANT PEAK GRADIENT: 24 MMHG
EKG ATRIAL RATE: 107 BPM
EKG ATRIAL RATE: 108 BPM
EKG ATRIAL RATE: 74 BPM
EKG P AXIS: 41 DEGREES
EKG P AXIS: 45 DEGREES
EKG P AXIS: 47 DEGREES
EKG P-R INTERVAL: 108 MS
EKG P-R INTERVAL: 126 MS
EKG P-R INTERVAL: 136 MS
EKG Q-T INTERVAL: 338 MS
EKG Q-T INTERVAL: 356 MS
EKG Q-T INTERVAL: 388 MS
EKG QRS DURATION: 80 MS
EKG QRS DURATION: 82 MS
EKG QRS DURATION: 94 MS
EKG QTC CALCULATION (BAZETT): 430 MS
EKG QTC CALCULATION (BAZETT): 451 MS
EKG QTC CALCULATION (BAZETT): 477 MS
EKG R AXIS: -17 DEGREES
EKG R AXIS: -29 DEGREES
EKG R AXIS: -46 DEGREES
EKG T AXIS: 32 DEGREES
EKG T AXIS: 48 DEGREES
EKG T AXIS: 56 DEGREES
EKG VENTRICULAR RATE: 107 BPM
EKG VENTRICULAR RATE: 108 BPM
EKG VENTRICULAR RATE: 74 BPM
EOSINOPHIL # BLD: 0.7 K/UL (ref 0–0.44)
EOSINOPHILS RELATIVE PERCENT: 6 % (ref 1–4)
ERYTHROCYTE [DISTWIDTH] IN BLOOD BY AUTOMATED COUNT: 13.2 % (ref 11.8–14.4)
GFR, ESTIMATED: >90 ML/MIN/1.73M2
GLUCOSE SERPL-MCNC: 95 MG/DL (ref 74–99)
HCT VFR BLD AUTO: 42.9 % (ref 40.7–50.3)
HGB BLD-MCNC: 14.5 G/DL (ref 13–17)
IMM GRANULOCYTES # BLD AUTO: 0.03 K/UL (ref 0–0.3)
IMM GRANULOCYTES NFR BLD: 0 %
INR PPP: 2.2
LYMPHOCYTES NFR BLD: 2.27 K/UL (ref 1.1–3.7)
LYMPHOCYTES RELATIVE PERCENT: 21 % (ref 24–43)
MCH RBC QN AUTO: 32.2 PG (ref 25.2–33.5)
MCHC RBC AUTO-ENTMCNC: 33.8 G/DL (ref 28.4–34.8)
MCV RBC AUTO: 95.1 FL (ref 82.6–102.9)
MONOCYTES NFR BLD: 0.98 K/UL (ref 0.1–1.2)
MONOCYTES NFR BLD: 9 % (ref 3–12)
NEUTROPHILS NFR BLD: 63 % (ref 36–65)
NEUTS SEG NFR BLD: 6.83 K/UL (ref 1.5–8.1)
NRBC BLD-RTO: 0 PER 100 WBC
PLATELET # BLD AUTO: 248 K/UL (ref 138–453)
PMV BLD AUTO: 9.6 FL (ref 8.1–13.5)
POTASSIUM SERPL-SCNC: 4.3 MMOL/L (ref 3.7–5.3)
PROTHROMBIN TIME: 25.3 SEC (ref 11.7–14.9)
RBC # BLD AUTO: 4.51 M/UL (ref 4.21–5.77)
SODIUM SERPL-SCNC: 136 MMOL/L (ref 136–145)
WBC OTHER # BLD: 11 K/UL (ref 3.5–11.3)

## 2025-06-27 PROCEDURE — 93306 TTE W/DOPPLER COMPLETE: CPT

## 2025-06-27 PROCEDURE — 36415 COLL VENOUS BLD VENIPUNCTURE: CPT

## 2025-06-27 PROCEDURE — 93010 ELECTROCARDIOGRAM REPORT: CPT | Performed by: INTERNAL MEDICINE

## 2025-06-27 PROCEDURE — 99232 SBSQ HOSP IP/OBS MODERATE 35: CPT | Performed by: INTERNAL MEDICINE

## 2025-06-27 PROCEDURE — 6370000000 HC RX 637 (ALT 250 FOR IP)

## 2025-06-27 PROCEDURE — 80048 BASIC METABOLIC PNL TOTAL CA: CPT

## 2025-06-27 PROCEDURE — 85610 PROTHROMBIN TIME: CPT

## 2025-06-27 PROCEDURE — 93306 TTE W/DOPPLER COMPLETE: CPT | Performed by: INTERNAL MEDICINE

## 2025-06-27 PROCEDURE — 99233 SBSQ HOSP IP/OBS HIGH 50: CPT | Performed by: SURGERY

## 2025-06-27 PROCEDURE — 85027 COMPLETE CBC AUTOMATED: CPT

## 2025-06-27 PROCEDURE — 2500000003 HC RX 250 WO HCPCS

## 2025-06-27 RX ORDER — NITROGLYCERIN 0.4 MG/1
0.4 TABLET SUBLINGUAL PRN
Qty: 25 TABLET | Refills: 3 | Status: SHIPPED | OUTPATIENT
Start: 2025-06-27

## 2025-06-27 RX ORDER — WARFARIN SODIUM 7.5 MG/1
7.5 TABLET ORAL
Status: DISCONTINUED | OUTPATIENT
Start: 2025-06-27 | End: 2025-06-27 | Stop reason: HOSPADM

## 2025-06-27 RX ADMIN — ASPIRIN 81 MG: 81 TABLET, CHEWABLE ORAL at 09:01

## 2025-06-27 RX ADMIN — SODIUM CHLORIDE, PRESERVATIVE FREE 10 ML: 5 INJECTION INTRAVENOUS at 09:07

## 2025-06-27 ASSESSMENT — ENCOUNTER SYMPTOMS
SHORTNESS OF BREATH: 0
COUGH: 0

## 2025-06-27 NOTE — PROGRESS NOTES
[facility]  Internal Medicine Teaching Residency Program  Inpatient Daily Progress Note  ______________________________________________________________________________    Patient: Jj Murry  YOB: 1970   MRN:[id number]    Acct: [id number]     Room: 0546/0546-01  Admit date: 6/25/2025  Today's date: 06/27/25  Number of days in the hospital: 2  CODE STATUS: Full Code     SUBJECTIVE   Admitting Diagnosis: NSTEMI (non-ST elevated myocardial infarction) (HCC)  CC: Chest pain   Pt examined at bedside. Chart & results reviewed.     -Overnight events: No acute events overnight  -Current hemodynamic status: Hemodynamically stable  -Urine output: 700 mL    Morning labs: Pending    BMP: .unremarkable  CBC: .unremarkable  Blood glucose: .100  Imaging studies: .  Diet: ADULT DIET; Regular     Review of Systems   Constitutional:  Negative for chills and fever.   Respiratory:  Negative for cough and shortness of breath.    Genitourinary:  Negative for dysuria.   Neurological:  Negative for headaches.        Specialty recommendations:    Cardiology: For concern of NSTEMI type II    Brief internal medicine plan:  discharge  BRIEF HISTORY     The patient is a 54 y.o. 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  According the patient he was all right until today when he started having chest pain sudden in onset while running for exercise however the chest pain was central, nonradiating and associated with shortness of breath and palpitation, he never lost consciousness no episodes of dizziness or lightheadedness.  He currently takes Coumadin however does not compliantly follow-up with Coumadin clinic, his last dose was yesterday.     Review of Systems   Constitutional:  Negative for chills and fever.   Respiratory:  Negative for cough and

## 2025-06-27 NOTE — PROGRESS NOTES
[facility]  Progress Note                   Date:   6/27/2025  Patient name: Jj Murry  Date of admission:  6/25/2025 11:09 AM  MRN:   [id number]  YOB: 1970  PCP: Teresa Alejandro PA-C    Reason for Admission: Cocaine abuse ([facility]) [F14.10]  NSTEMI (non-ST elevated myocardial infarction) ([facility]) [I21.4]  Chest pain, unspecified type [R07.9]    Subjective:       Clinical Changes /Abnormalities:Patient seen and examined. Denies chest pain or shortness of breath. Tele/vitals/labs reviewed . Wants to go home     Review of Systems    Medications:   Scheduled Meds:   warfarin  7.5 mg Oral Once    warfarin placeholder: dosing by pharmacy   Oral RX Placeholder    sodium chloride flush  5-40 mL IntraVENous 2 times per day    melatonin  3 mg Oral Nightly    atorvastatin  40 mg Oral Nightly    aspirin  81 mg Oral Daily     Continuous Infusions:   sodium chloride       CBC:   Recent Labs     06/25/25  1130 06/26/25  0724 06/27/25  0622   WBC 9.7 9.2 11.0   HGB 13.8 14.4 14.5    252 248     BMP:    Recent Labs     06/25/25  1130 06/26/25  0724 06/27/25  0622   * 139 136   K 3.8 4.1 4.3   CL 98 108* 106   CO2 19* 19* 18*   BUN 11 13 16   CREATININE 1.2 0.9 0.9   GLUCOSE 101* 100* 95     Hepatic:  Recent Labs     06/25/25  1130   AST 28   ALT 16   BILITOT 0.5   ALKPHOS 94     Troponin:   Recent Labs     06/26/25  0724 06/26/25  1119 06/26/25  1827   TROPHS 12 10 9     BNP: No results for input(s): \"BNP\" in the last 72 hours.  Lipids:   Recent Labs     06/25/25  1458   CHOL 152   HDL 47     INR:   Recent Labs     06/25/25  1130 06/26/25  0724 06/27/25  0622   INR 5.3* 3.6 2.2       CTA FINDINGS: 6/2025  CALCIUM SCORE:     Left main: Zero (0).     Left anterior descending: Zero (0).     Left circumflex: 15     Right coronary artery zero (0).     Total Agatston calcium score: 15    Objective:   Vitals: /75   Pulse 60   Temp 98.6 °F (37 °C) (Oral)   Resp 20   Ht 1.778 m

## 2025-06-27 NOTE — PROGRESS NOTES
Pharmacy Note  Warfarin Consult follow-up      Recent Labs     06/27/25  0622   INR 2.2     Recent Labs     06/25/25  1130 06/26/25  0724   HGB 13.8 14.4   HCT 38.5* 44.8    252       Current warfarin drug-drug interactions:      Date INR Dose   6/25 5.3    6/26 3.6    6/27 2.2 7.5 mg       Notes:                   Restart warfarin 7.5 mg today.  Daily PT/INR while inpatient.    Abdi Spangler PharmD, BCPS  6/27/2025  9:13 AM

## 2025-06-27 NOTE — PLAN OF CARE
Problem: Chronic Conditions and Co-morbidities  Goal: Patient's chronic conditions and co-morbidity symptoms are monitored and maintained or improved  6/27/2025 1250 by Keven Leiva RN  Outcome: Adequate for Discharge  6/27/2025 1250 by Keven Leiva RN  Outcome: Adequate for Discharge  6/27/2025 0113 by Brie Gallagher RN  Outcome: Progressing     Problem: Discharge Planning  Goal: Discharge to home or other facility with appropriate resources  6/27/2025 1250 by Keven Leiva RN  Outcome: Adequate for Discharge  6/27/2025 1250 by Keven Leiva RN  Outcome: Adequate for Discharge  6/27/2025 0113 by Brie Gallagher RN  Outcome: Progressing     Problem: Pain  Goal: Verbalizes/displays adequate comfort level or baseline comfort level  6/27/2025 1250 by Keven Leiva RN  Outcome: Adequate for Discharge  6/27/2025 1250 by Keven Leiva RN  Outcome: Adequate for Discharge  6/27/2025 0113 by Brie Gallagher RN  Outcome: Progressing

## 2025-06-27 NOTE — PLAN OF CARE
Problem: Chronic Conditions and Co-morbidities  Goal: Patient's chronic conditions and co-morbidity symptoms are monitored and maintained or improved  6/27/2025 0113 by Brie Gallagher RN  Outcome: Progressing  6/26/2025 1859 by Keven Leiva, RN  Outcome: Progressing     Problem: Discharge Planning  Goal: Discharge to home or other facility with appropriate resources  6/27/2025 0113 by Brie Gallagher RN  Outcome: Progressing  6/26/2025 1859 by Keven Leiva, RN  Outcome: Progressing     Problem: Pain  Goal: Verbalizes/displays adequate comfort level or baseline comfort level  6/27/2025 0113 by Brie Gallagher RN  Outcome: Progressing  6/26/2025 1859 by Keven Leiva, RN  Outcome: Progressing

## 2025-07-01 NOTE — PROGRESS NOTES
Physician Progress Note      PATIENT:               VESNA BUCK  CSN #:                  [id number]  :                       1970  ADMIT DATE:       2025 11:09 AM  DISCH DATE:        2025 12:52 PM  RESPONDING  PROVIDER #:        TARAN NEGRON          QUERY TEXT:    Based on your medical judgment, please  further clarify the circumstances of   NSTEMI type 2 and cocaine abuse. and if any of the following are being   evaluated and/or treated:    The clinical indicators include:  patient presented with chest pain per ED occurred while out for a run. ED   notes \"EMS  was called and patient was confused and agitated. and while in ED   was acutely agitated and rippin gout iv. uds positive for amphetamine, cocaine   and cannabinoid. cardiology consult notes \" not sure why tox screen is   positive i do not use drugs\". CTA chest notes small calcified plaque.Echo   completed  Options provided:  -- NSTEMI type 2 with chest pain  associated with toxic effects of cocaine   from cocaine abuse  -- NSTEMI type 2 with chest pain not associated with toxic effects of cocaine   from cocaine abuse  -- Other - I will add my own diagnosis  -- Disagree - Not applicable / Not valid  -- Disagree - Clinically unable to determine / Unknown  -- Refer to Clinical Documentation Reviewer    PROVIDER RESPONSE TEXT:    NSTEMI type 2 with chest pain associated with toxic effects of cocaine from   cocaine abuse    Query created by: Dee Temple on 2025 5:48 AM      QUERY TEXT:    A mental status change is documented in the medical record in ED on .    Please specify the underlying cause:    The clinical indicators include:  Patient presented with Chest pain. AMS noted per ED physician. per ED   \"agitated pulling out iv and given benzo and ativan\". history of cva with   baseline aphasia. UDS positive for  cocaine, amphetamine and cannabinoid.  Options provided:  -- Drug-induced encephalopathy related to cocaine,

## 2025-07-01 NOTE — DISCHARGE SUMMARY
[facility]     Department of Internal Medicine - Staff Internal Medicine Teaching Service    INPATIENT DISCHARGE SUMMARY      Patient Identification:  Jj Murry is a 54 y.o. male.  :  1970  MRN: [id number]     Acct: [id number]   PCP: Teresa Alejandro PA-C  Admit Date:  2025  Discharge date and time: 2025 12:52 PM   Attending Provider: No att. providers found                                     ACTIVE DISCHARGE DIAGNOSES     Hospital Problem Lists:  Principal Problem:    NSTEMI (non-ST elevated myocardial infarction) (HCC)  Active Problems:    Cocaine abuse (HCC)    Cerebrovascular accident (CVA) due to embolism of left middle cerebral artery (HCC)    History of aortic valve replacement with metallic valve    Supratherapeutic INR    Expressive aphasia    Dyslipidemia  Resolved Problems:    * No resolved hospital problems. *      HOSPITAL STAY     Brief Inpatient course:   Jj Murry is a 54 y.o. 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     Hospital course:  Patient was having exertional sudden onset chest pain, he was also having some concern of hallucinations and was brought by EMS to ED, on arrival in ED patient had supra therapeutic INR, and elevated troponin, his UDS was positive for cocaine.  Cardiology was consulted and recommended CT coronary angiogram. His coumadin was held and was resumed after his INR becomes therapeutic. His chest pain resolved and CT coronary angiogram revealed less than 50 % stenosis of LCx, he was recommended to follow up with cardiology and coumadin clinic.    Procedures/ Significant Interventions:    none    Consults:     Consults:     Final Specialist Recommendations/Findings:   IP